# Patient Record
Sex: MALE | Race: WHITE
[De-identification: names, ages, dates, MRNs, and addresses within clinical notes are randomized per-mention and may not be internally consistent; named-entity substitution may affect disease eponyms.]

---

## 2022-04-27 ENCOUNTER — APPOINTMENT (OUTPATIENT)
Dept: UROLOGY | Facility: CLINIC | Age: 42
End: 2022-04-27
Payer: COMMERCIAL

## 2022-04-27 VITALS
HEIGHT: 70 IN | HEART RATE: 75 BPM | TEMPERATURE: 98.2 F | DIASTOLIC BLOOD PRESSURE: 83 MMHG | WEIGHT: 184 LBS | OXYGEN SATURATION: 94 % | SYSTOLIC BLOOD PRESSURE: 144 MMHG | BODY MASS INDEX: 26.34 KG/M2 | RESPIRATION RATE: 16 BRPM

## 2022-04-27 DIAGNOSIS — Z78.9 OTHER SPECIFIED HEALTH STATUS: ICD-10-CM

## 2022-04-27 DIAGNOSIS — K46.9 UNSPECIFIED ABDOMINAL HERNIA W/OUT OBSTRUCTION OR GANGRENE: ICD-10-CM

## 2022-04-27 PROCEDURE — 99204 OFFICE O/P NEW MOD 45 MIN: CPT

## 2022-04-27 NOTE — HISTORY OF PRESENT ILLNESS
[FreeTextEntry1] : STELLA STARK is a 41 year M who presents today as a new patient evaluation for vasectomy evaluation.\par \par He has 2 children, ages 5 and 7.  He is .  His wife is currently using oral contraceptives, but would like to come off these.  No history of scrotal pain or scrotal surgeries, but did have an inguinal hernia repair several years ago.  He is sure he would like a vasectomy.  Denies gross hematuria, flank pain, fevers, chills, nausea, vomiting.

## 2022-04-27 NOTE — PHYSICAL EXAM
[General Appearance - Well Developed] : well developed [Edema] : no peripheral edema [Abdomen Tenderness] : non-tender [Urethral Meatus] : meatus normal [Penis Abnormality] : normal circumcised penis [Epididymis] : the epididymides were normal [Testes Tenderness] : no tenderness of the testes [Testes Mass (___cm)] : there were no testicular masses [FreeTextEntry1] : Loose scrotum, bilateral vas deferens palpated

## 2022-04-27 NOTE — ASSESSMENT
[FreeTextEntry1] : Mr. Blanc is a 41-year-old male who presents for vasectomy evaluation\par \par I counseled the patient extensively today with regard to vasectomy. I discussed the potential risks and benefits of the procedure with the patient including the potential for bleeding and infection. I also discussed the fact that this procedure should be considered irreversible and if there is any question in the patient's mind, I have encouraged him to reconsider his decision to move forward with the procedure. I also discussed the fact that he may remain temporarily fertile for a period of up to 3 months after undergoing the procedure and that a post vasectomy semen analysis would be required to confirm the absence of sperm in the ejaculate.\par \par I discussed options of performing the procedure either under local anesthesia in the office versus under a more general IV sedation in the operating room. He has decided to move forward with the procedure under local anesthesia and I will schedule that at the earliest available time.  During the visit today, the patient signed the initial consent form with 30-day lead time for the procedure understanding that if he does ultimately decide to change his mind regarding vasectomy, he can do so at any time.

## 2022-04-27 NOTE — REVIEW OF SYSTEMS
[Seen by urologist before (Name)  ___] : Preciously seen by a urologist: [unfilled] [FreeTextEntry4] : Vasectomy consult

## 2022-10-25 ENCOUNTER — APPOINTMENT (OUTPATIENT)
Dept: UROLOGY | Facility: CLINIC | Age: 42
End: 2022-10-25

## 2022-10-25 ENCOUNTER — LABORATORY RESULT (OUTPATIENT)
Age: 42
End: 2022-10-25

## 2022-10-25 DIAGNOSIS — Z30.09 ENCOUNTER FOR OTHER GENERAL COUNSELING AND ADVICE ON CONTRACEPTION: ICD-10-CM

## 2022-10-25 PROCEDURE — 55250 REMOVAL OF SPERM DUCT(S): CPT

## 2022-10-31 ENCOUNTER — NON-APPOINTMENT (OUTPATIENT)
Age: 42
End: 2022-10-31

## 2022-11-10 ENCOUNTER — APPOINTMENT (OUTPATIENT)
Dept: UROLOGY | Facility: CLINIC | Age: 42
End: 2022-11-10

## 2024-01-20 ENCOUNTER — APPOINTMENT (OUTPATIENT)
Dept: ORTHOPEDIC SURGERY | Facility: CLINIC | Age: 44
End: 2024-01-20
Payer: COMMERCIAL

## 2024-01-20 VITALS — WEIGHT: 186 LBS | BODY MASS INDEX: 26.63 KG/M2 | HEIGHT: 70 IN

## 2024-01-20 DIAGNOSIS — M75.52 BURSITIS OF LEFT SHOULDER: ICD-10-CM

## 2024-01-20 DIAGNOSIS — M75.42 IMPINGEMENT SYNDROME OF LEFT SHOULDER: ICD-10-CM

## 2024-01-20 PROCEDURE — 20610 DRAIN/INJ JOINT/BURSA W/O US: CPT | Mod: LT

## 2024-01-20 PROCEDURE — 73030 X-RAY EXAM OF SHOULDER: CPT | Mod: LT

## 2024-01-20 PROCEDURE — J3490M: CUSTOM

## 2024-01-20 PROCEDURE — 99203 OFFICE O/P NEW LOW 30 MIN: CPT | Mod: 25

## 2024-01-20 RX ORDER — DICLOFENAC SODIUM 75 MG/1
75 TABLET, DELAYED RELEASE ORAL
Qty: 60 | Refills: 0 | Status: ACTIVE | COMMUNITY
Start: 2024-01-20 | End: 1900-01-01

## 2024-01-20 NOTE — PHYSICAL EXAM
[Left] : left shoulder [Sitting] : sitting [Mild] : mild [5 ___] : forward flexion 5[unfilled]/5 [5___] : internal rotation 5[unfilled]/5 [] : positive impingement testing [FreeTextEntry9] : full rom pain at terminal ff and with ex rotation

## 2024-01-29 ENCOUNTER — APPOINTMENT (OUTPATIENT)
Dept: ORTHOPEDIC SURGERY | Facility: CLINIC | Age: 44
End: 2024-01-29
Payer: COMMERCIAL

## 2024-01-29 VITALS — HEIGHT: 70 IN | WEIGHT: 186 LBS | BODY MASS INDEX: 26.63 KG/M2

## 2024-01-29 DIAGNOSIS — M75.32 CALCIFIC TENDINITIS OF LEFT SHOULDER: ICD-10-CM

## 2024-01-29 PROCEDURE — 99203 OFFICE O/P NEW LOW 30 MIN: CPT

## 2024-01-29 NOTE — IMAGING
[de-identified] : No swelling, no ecchymosis, no deformity, no scapular winging. No tenderness to palpation over shoulder, AC joint or trapezius. Forward flexion: Active 180 degrees; Passive 180 degrees Abduction: Active 180 degrees; Passive 180 degrees External rotation (with shoulder abducted): Active 90 degrees , Passive 90 degrees Internal rotation (with shoulder abducted): Active 70 degrees, Passive 70 degrees Extension: Active 60 degrees; passive 60 degrees Adduction: Active 30 degrees; passive 30 degrees 5/5 supraspinatus, 5/5 infraspinatus and 5/5 subscapularis. Negative Reid test, negative impingement sign, negative Segal test. Speeds and Yergason negative Motor and sensory intact distally

## 2024-01-29 NOTE — ASSESSMENT
[FreeTextEntry1] : Complete relief after corticosteroid injection at our urgent care.  Strength and range of motion normal today.  Long discussion about the diagnosis and long-term prognosis.  Could consider MRI down the line if symptoms recur.  Eventually may need formal extraction

## 2024-01-29 NOTE — REASON FOR VISIT
[FreeTextEntry2] : New Consult; Left shoulder -Noted some relief from prev  CSI injection from [facility]

## 2024-01-29 NOTE — HISTORY OF PRESENT ILLNESS
[6] : 6 [3] : 3 [] : yes [FreeTextEntry5] : No reported injury, pain started a few weeks ago. [FreeTextEntry7] : into the elbow